# Patient Record
Sex: MALE | Race: WHITE | NOT HISPANIC OR LATINO | Employment: FULL TIME | URBAN - METROPOLITAN AREA
[De-identification: names, ages, dates, MRNs, and addresses within clinical notes are randomized per-mention and may not be internally consistent; named-entity substitution may affect disease eponyms.]

---

## 2017-04-22 ENCOUNTER — HOSPITAL ENCOUNTER (EMERGENCY)
Facility: CLINIC | Age: 52
Discharge: HOME | End: 2017-04-22
Attending: EMERGENCY MEDICINE | Admitting: EMERGENCY MEDICINE
Payer: COMMERCIAL

## 2017-04-22 VITALS
BODY MASS INDEX: 27.74 KG/M2 | OXYGEN SATURATION: 98 % | HEIGHT: 68 IN | DIASTOLIC BLOOD PRESSURE: 101 MMHG | WEIGHT: 183 LBS | RESPIRATION RATE: 16 BRPM | HEART RATE: 120 BPM | TEMPERATURE: 98.5 F | SYSTOLIC BLOOD PRESSURE: 150 MMHG

## 2017-04-22 DIAGNOSIS — S01.112A: ICD-10-CM

## 2017-04-22 PROCEDURE — 12052 INTMD RPR FACE/MM 2.6-5.0 CM: CPT

## 2017-04-22 PROCEDURE — 99284 EMERGENCY DEPT VISIT MOD MDM: CPT | Mod: 25

## 2017-04-22 ASSESSMENT — ENCOUNTER SYMPTOMS
NAUSEA: 0
VOMITING: 0
HEADACHES: 0
WOUND: 1

## 2017-04-22 NOTE — ED PROVIDER NOTES
"  History     Chief Complaint:  Head Laceration     HPI   Gonzalez Bianchi is a 51 year old male who presents to the emergency department today for evaluation of a head laceration. The patient reports that he was playing rugby this morning and collided his head with another player's head. He has a laceration to his left eyelid region and came in for evaluation for this reason. He affirms that he did not lose consciousness, is not experiencing any vision changes, nausea, vomiting, headaches, or other symptoms at this time. Tetanus is up to date per [facility] review.     Allergies:  No Known Drug Allergies    Medications:    The patient is currently on no regular medications.      Past Medical History:    History reviewed. No pertinent past medical history.    Past Surgical History:    Hernia Repair    Family History:    History reviewed. No pertinent family history.     Social History:  The patient was accompanied to the ED by his teammate.   Smoking Status: Current Every Day Smoker  Alcohol Use: Positive  Marital Status:  Single [1]    Review of Systems   Gastrointestinal: Negative for nausea and vomiting.   Skin: Positive for wound (Laceration inferior to left eyebrow).   Neurological: Negative for syncope and headaches.   All other systems reviewed and are negative.  10 point review of systems performed and is negative except as above and in HPI.    Physical Exam   Vitals:  Patient Vitals for the past 24 hrs:   BP Temp Temp src Pulse Resp SpO2 Height Weight   04/22/17 1145 (!) 150/101 98.5  F (36.9  C) Oral 120 16 98 % 1.727 m (5' 8\") 83 kg (183 lb)     Physical Exam   General: Resting on the gurney, appears uncomfortable  Head:  Laceration to superior to left eye; see images below. No other evidence of trauma.   Mouth/Throat: Mucus membranes are moist  CV:  Regular rate    Normal S1 and S2  No pathological murmur   Resp:  Breath sounds clear and equal bilaterally    Non-labored, no retractions or accessory muscle " use    No coarseness    No wheezing   GI:  Abdomen is soft, no rigidity    No tenderness to palpation  MS:  Normal motor assessment of all extremities.    Good capillary refill noted.  Skin:  No rash or lesions noted.  Neuro: Speech is normal and fluent. No apparent deficit. Cranial nerves 2-12 intact. Sensation and strength to extremities x4. Normal lid opening and closing. No evidence of trauma to the eye itself.   Psych:  Awake. Alert.  Normal affect.      Appropriate interactions.                Emergency Department Course     Procedures:     Laceration Repair      LACERATION:  A subcutaneous clean 5 cm laceration.    LOCATION:  Left upper eyelid.    FUNCTION:  Distally sensation, circulation, motor and tendon function are intact. Patient has normal lid function and facial expressions.    ANESTHESIA:  LET - Topical.    PREPARATION:  Irrigation with Normal Saline and Shur Clens.    DEBRIDEMENT: No debridement.    CLOSURE:  Wound was closed with Two Layers: Subcutaneous layer closed with 2 x 5.0 Vicryl Sutures. Skin closed with 6 x 6.0 Ethylon using interrupted sutures.      Emergency Department Course:  Nursing notes and vitals reviewed.  I performed an exam of the patient as documented above.   1222: I spoke with Dr. Bingham of Opthalmology service from the [facility] regarding patient's presentation, findings, and plan of care.  I discussed the treatment plan with the patient. He expressed understanding of this plan and consented to discharge. He will be discharged home with instructions for care and follow up. In addition, the patient will return to the emergency department if their symptoms persist, worsen, if new symptoms arise or if there is any concern.  All questions were answered.    Impression & Plan      Medical Decision Making:  Gonzalez Bianchi is a 51 year old male who presents for evaluation of a laceration to his upper left eyelid. I doubt a midface fracture and will not CT scan at  this point. No signs of entrapment or displaced fracture on detailed midface clinical exam.  Cervical spine is cleared clinically.  The head to toe trauma is exam is negative otherwise and further trauma workup is not necessary.   The wound was carefully evaluated and explored.  The laceration was closed with sutures as noted above. There is no evidence of muscular, tendon, or bony damage with this laceration.  No signs of foreign body.  Possible complications (infection, scarring) were reviewed with the patient.  As the patient's laceration was deep near the orbital margin, I spoke with Dr. Bingham from opthalmology who was able to view the images and recommended two layer closure. The patient had no changes in vision and extraocular movements and facial expressions were intact.     By the [place] head CT rules the patient does not warrant head CT evaluation. Based on workup I believe patient is very low risk for skull fracture and/or intracerebral bleeding now or in future. Discussed delayed bleed.  Concussion is likewise of very low probability with no loss of consciousness and normal mental status here    Follow up with primary care will be indicated for suture removal as noted in the discharge section.    Diagnosis:    ICD-10-CM    1. Laceration of eye region, left, initial encounter S01.102A        Scribe Disclosure:  I, Charles Smith, am serving as a scribe at 12:09 PM on 4/22/2017 to document services personally performed by Chelsi Mckay MD, based on my observations and the provider's statements to me.    4/22/2017    EMERGENCY DEPARTMENT       Chelsi Mckay MD  04/24/17 8191

## 2017-04-22 NOTE — DISCHARGE INSTRUCTIONS

## 2017-04-22 NOTE — ED AVS SNAPSHOT
Emergency Department    [address] [facility] [address]    Phone:  [phone]    Fax:  [phone]                                       Gonzalez Bianchi   MRN: [id number]    Department:   Emergency Department   Date of Visit:  4/22/2017           Patient Information     Date Of Birth          1965        Your diagnoses for this visit were:     Laceration of eye region, left, initial encounter        You were seen by Chelsi Mckay MD.      Follow-up Information     Follow up with Clinic, [facility]. Schedule an appointment as soon as possible for a visit in 3 days.    Why:  For wound re-check    Contact information:    [address] [facility] [address] [phone]          Follow up with Clinic, [facility] In 6 days.    Why:  For suture removal    Contact information:    [address] [facility] [address] [phone]          Follow up with  Emergency Department.    Specialty:  EMERGENCY MEDICINE    Why:  As needed, If symptoms worsen    Contact information:    [address] [facility] [id number] [phone]        Discharge Instructions       Discharge Instructions  Laceration (Cut)    You were seen today for a laceration (cut).  Your doctor examined your laceration for any problems such a buried foreign body (like glass, a splinter, or gravel), or injury to blood vessels, tendons, and nerves.  Your doctor may have also rinsed and/or scrubbed your laceration to help prevent an infection.  Your laceration may have been closed with glue, staples or sutures (stitches).      It may not be possible to find all problems with your laceration on the first visit, and we can't always prevent infections.  Antibiotics are only given when the benefit is more than the risk, and don't prevent all infections. Some lacerations are too high risk to close, and are left open to heal.  All lacerations, no matter how  expertly repaired, will cause scarring.    Return to the Emergency Department right away if:    You have more redness, swelling, pain, drainage (pus), a bad smell, or red streaking from your laceration.      You have a fever of 101oF or more.    You have bleeding that you can t stop at home. If your cut starts to bleed, hold pressure on the bleeding area with a clean cloth or put pressure over the bandage.  If the bleeding doesn t stop after using constant pressure for 30 minutes, you should return to the Emergency Department for further treatment.    An area past the laceration is cool, pale, or blue compared with the other side, or has a slower return of color when squeezed.    Your dressing seems too tight or starts to get uncomfortable or painful.    You have loss of normal function or use of an area, such as being unable to straighten or bend a finger normally.    You have a numb area past the laceration.    Return to the Emergency Department or see your regular doctor if:    The laceration starts to come open.     You have something coming out of the cut or a feeling that there is something in the laceration.    Your wound will not heal, or keeps breaking open. There can always be glass, wood, dirt or other things in any wound.  They won t always show up, even on x-rays.  If a wound doesn t heal, this may be why, and it is important to follow-up with your regular doctor.    Home Care:    Take your dressing off in 12 hours, or as instructed by your doctor, to check your laceration. Remove the dressing sooner if it seems too tight or painful, or if it is getting numb, tingly, or pale past the dressing.    Gently wash your laceration 2 times a day with clean cloth and soap.     It is okay to shower, but do not let the laceration soak in water.      If your laceration was closed with wound adhesive or strips: pat it dry and leave it open to the air.     For all other repairs: after you wash your laceration, or at  "least 2 times a day, apply bacitracin or other antibiotic ointment to the laceration, then cover it with a Band-Aid  or gauze.    Keep the laceration clean. Wear gloves or other protective clothing if you are around dirt.    Follow-up:    You need to follow-up with your regular doctor in 3 days.    Your sutures or staples need to be removed in 6 days. Schedule an appointment with your regular doctor to have this done.    Scars:  To help minimize scarring:    Wear sunscreen over the healed laceration when out in the sun.    Massage the area regularly.    You may use Vitamin E oil.    Wait a year.  Most scars will start to fade within a year.    Probiotics: If you have been given an antibiotic, you may want to also take a probiotic pill or eat yogurt with live cultures. Probiotics have \"good bacteria\" to help your intestines stay healthy. Studies have shown that probiotics help prevent diarrhea and other intestine problems (including C. diff infection) when you take antibiotics. You can buy these without a prescription in the pharmacy section of the store.     If you were given a prescription for medicine here today, be sure to read all of the information (including the package insert) that comes with your prescription.  This will include important information about the medicine, its side effects, and any warnings that you need to know about.  The pharmacist who fills the prescription can provide more information and answer questions you may have about the medicine.  If you have questions or concerns that the pharmacist cannot address, please call or return to the Emergency Department.     Opioid Medication Information    Pain medications are among the most commonly prescribed medicines, so we are including this information for all our patients. If you did not receive pain medication or get a prescription for pain medicine, you can ignore it.     You may have been given a prescription for an opioid (narcotic) pain " medicine and/or have received a pain medicine while here in the Emergency Department. These medicines can make you drowsy or impaired. You must not drive, operate dangerous equipment, or engage in any other dangerous activities while taking these medications. If you drive while taking these medications, you could be arrested for DUI, or driving under the influence. Do not drink any alcohol while you are taking these medications.     Opioid pain medications can cause addiction. If you have a history of chemical dependency of any type, you are at a higher risk of becoming addicted to pain medications.  Only take these prescribed medications to treat your pain when all other options have been tried. Take it for as short a time and as few doses as possible. Store your pain pills in a secure place, as they are frequently stolen and provide a dangerous opportunity for children or visitors in your house to start abusing these powerful medications. We will not replace any lost or stolen medicine.  As soon as your pain is better, you should flush all your remaining medication.     Many prescription pain medications contain Tylenol  (acetaminophen), including Vicodin , Tylenol #3 , Norco , Lortab , and Percocet .  You should not take any extra pills of Tylenol  if you are using these prescription medications or you can get very sick.  Do not ever take more than 3000 mg of acetaminophen in any 24 hour period.    All opioids tend to cause constipation. Drink plenty of water and eat foods that have a lot of fiber, such as fruits, vegetables, prune juice, apple juice and high fiber cereal.  Take a laxative if you don t move your bowels at least every other day. Miralax , Milk of Magnesia, Colace , or Senna  can be used to keep you regular.      Remember that you can always come back to the Emergency Department if you are not able to see your regular doctor in the amount of time listed above, if you get any new symptoms, or if  there is anything that worries you.          24 Hour Appointment Hotline       To make an appointment at any [facility], call [phone] ([phone]). If you don't have a family doctor or clinic, we will help you find one. [facility] clinics are conveniently located to serve the needs of you and your family.             Review of your medicines      Notice     You have not been prescribed any medications.            Orders Needing Specimen Collection     None      Pending Results     No orders found from 4/20/2017 to 4/23/2017.            Pending Culture Results     No orders found from 4/20/2017 to 4/23/2017.            Test Results From Your Hospital Stay               Clinical Quality Measure: Blood Pressure Screening     Your blood pressure was checked while you were in the emergency department today. The last reading we obtained was  BP: (!) 150/101 . Please read the guidelines below about what these numbers mean and what you should do about them.  If your systolic blood pressure (the top number) is less than 120 and your diastolic blood pressure (the bottom number) is less than 80, then your blood pressure is normal. There is nothing more that you need to do about it.  If your systolic blood pressure (the top number) is 120-139 or your diastolic blood pressure (the bottom number) is 80-89, your blood pressure may be higher than it should be. You should have your blood pressure rechecked within a year by a primary care provider.  If your systolic blood pressure (the top number) is 140 or greater or your diastolic blood pressure (the bottom number) is 90 or greater, you may have high blood pressure. High blood pressure is treatable, but if left untreated over time it can put you at risk for heart attack, stroke, or kidney failure. You should have your blood pressure rechecked by a primary care provider within the next 4 weeks.  If your provider in the emergency department today gave you specific  "instructions to follow-up with your doctor or provider even sooner than that, you should follow that instruction and not wait for up to 4 weeks for your follow-up visit.        Thank you for choosing [facility]       Thank you for choosing [facility] for your care. Our goal is always to provide you with excellent care. Hearing back from our patients is one way we can continue to improve our services. Please take a few minutes to complete the written survey that you may receive in the mail after you visit with us. Thank you!        [employer] Information     [employer] lets you send messages to your doctor, view your test results, renew your prescriptions, schedule appointments and more. To sign up, go to [email or web address] . Click on \"Log in\" on the left side of the screen, which will take you to the Welcome page. Then click on \"Sign up Now\" on the right side of the page.     You will be asked to enter the access code listed below, as well as some personal information. Please follow the directions to create your username and password.     Your access code is: [email or web address]  Expires: 2017  2:07 PM     Your access code will  in 90 days. If you need help or a new code, please call your [facility] clinic or [phone].        Care EveryWhere ID     This is your Care EveryWhere ID. This could be used by other organizations to access your [facility] medical records  [id number]        After Visit Summary       This is your record. Keep this with you and show to your community pharmacist(s) and doctor(s) at your next visit.                  "

## 2017-04-22 NOTE — ED AVS SNAPSHOT
Emergency Department    [address] [place] [address]    Phone:  [phone]    Fax:  [phone]                                       Gonzalez Bianchi   MRN: [id number]    Department:   Emergency Department   Date of Visit:  4/22/2017           After Visit Summary Signature Page     I have received my discharge instructions, and my questions have been answered. I have discussed any challenges I see with this plan with the nurse or doctor.    ..........................................................................................................................................  Patient/Patient Representative Signature      ..........................................................................................................................................  Patient Representative Print Name and Relationship to Patient    ..................................................               ................................................  Date                                            Time    ..........................................................................................................................................  Reviewed by Signature/Title    ...................................................              ..............................................  Date                                                            Time

## 2022-11-11 ENCOUNTER — HOSPITAL ENCOUNTER (OUTPATIENT)
Facility: CLINIC | Age: 57
Discharge: HOME | End: 2022-11-11
Attending: EMERGENCY MEDICINE | Admitting: COLON & RECTAL SURGERY
Payer: COMMERCIAL

## 2022-11-11 ENCOUNTER — ANESTHESIA EVENT (OUTPATIENT)
Dept: SURGERY | Facility: CLINIC | Age: 57
End: 2022-11-11
Payer: COMMERCIAL

## 2022-11-11 ENCOUNTER — APPOINTMENT (OUTPATIENT)
Dept: CT IMAGING | Facility: CLINIC | Age: 57
End: 2022-11-11
Attending: EMERGENCY MEDICINE
Payer: COMMERCIAL

## 2022-11-11 ENCOUNTER — ANESTHESIA (OUTPATIENT)
Dept: SURGERY | Facility: CLINIC | Age: 57
End: 2022-11-11
Payer: COMMERCIAL

## 2022-11-11 VITALS
HEART RATE: 99 BPM | HEIGHT: 68 IN | WEIGHT: 182.98 LBS | RESPIRATION RATE: 17 BRPM | OXYGEN SATURATION: 96 % | BODY MASS INDEX: 27.73 KG/M2 | DIASTOLIC BLOOD PRESSURE: 87 MMHG | TEMPERATURE: 99 F | SYSTOLIC BLOOD PRESSURE: 129 MMHG

## 2022-11-11 DIAGNOSIS — K61.1 PERIRECTAL ABSCESS: ICD-10-CM

## 2022-11-11 DIAGNOSIS — E87.6 HYPOKALEMIA: ICD-10-CM

## 2022-11-11 LAB
ALBUMIN SERPL-MCNC: 2.7 G/DL (ref 3.4–5)
ALP SERPL-CCNC: 68 U/L (ref 40–150)
ALT SERPL W P-5'-P-CCNC: 46 U/L (ref 0–70)
ANION GAP SERPL CALCULATED.3IONS-SCNC: 8 MMOL/L (ref 3–14)
AST SERPL W P-5'-P-CCNC: 25 U/L (ref 0–45)
BASOPHILS # BLD AUTO: 0 10E3/UL (ref 0–0.2)
BASOPHILS NFR BLD AUTO: 0 %
BILIRUB SERPL-MCNC: 1.5 MG/DL (ref 0.2–1.3)
BUN SERPL-MCNC: 11 MG/DL (ref 7–30)
CALCIUM SERPL-MCNC: 8.1 MG/DL (ref 8.5–10.1)
CHLORIDE BLD-SCNC: 107 MMOL/L (ref 94–109)
CO2 SERPL-SCNC: 24 MMOL/L (ref 20–32)
CREAT SERPL-MCNC: 0.89 MG/DL (ref 0.66–1.25)
EOSINOPHIL # BLD AUTO: 0 10E3/UL (ref 0–0.7)
EOSINOPHIL NFR BLD AUTO: 0 %
ERYTHROCYTE [DISTWIDTH] IN BLOOD BY AUTOMATED COUNT: 11.9 % (ref 10–15)
GFR SERPL CREATININE-BSD FRML MDRD: >90 ML/MIN/1.73M2
GLUCOSE BLD-MCNC: 93 MG/DL (ref 70–99)
HCT VFR BLD AUTO: 43.7 % (ref 40–53)
HGB BLD-MCNC: 14.7 G/DL (ref 13.3–17.7)
IMM GRANULOCYTES # BLD: 0.1 10E3/UL
IMM GRANULOCYTES NFR BLD: 0 %
LYMPHOCYTES # BLD AUTO: 1 10E3/UL (ref 0.8–5.3)
LYMPHOCYTES NFR BLD AUTO: 7 %
MCH RBC QN AUTO: 31.7 PG (ref 26.5–33)
MCHC RBC AUTO-ENTMCNC: 33.6 G/DL (ref 31.5–36.5)
MCV RBC AUTO: 94 FL (ref 78–100)
MONOCYTES # BLD AUTO: 1.1 10E3/UL (ref 0–1.3)
MONOCYTES NFR BLD AUTO: 8 %
NEUTROPHILS # BLD AUTO: 12.1 10E3/UL (ref 1.6–8.3)
NEUTROPHILS NFR BLD AUTO: 85 %
NRBC # BLD AUTO: 0 10E3/UL
NRBC BLD AUTO-RTO: 0 /100
PLATELET # BLD AUTO: 299 10E3/UL (ref 150–450)
POTASSIUM BLD-SCNC: 3.3 MMOL/L (ref 3.4–5.3)
PROT SERPL-MCNC: 6.6 G/DL (ref 6.8–8.8)
RBC # BLD AUTO: 4.64 10E6/UL (ref 4.4–5.9)
SARS-COV-2 RNA RESP QL NAA+PROBE: NEGATIVE
SODIUM SERPL-SCNC: 139 MMOL/L (ref 133–144)
WBC # BLD AUTO: 14.3 10E3/UL (ref 4–11)

## 2022-11-11 PROCEDURE — C9803 HOPD COVID-19 SPEC COLLECT: HCPCS

## 2022-11-11 PROCEDURE — 99285 EMERGENCY DEPT VISIT HI MDM: CPT | Mod: 25

## 2022-11-11 PROCEDURE — 250N000009 HC RX 250: Performed by: ANESTHESIOLOGY

## 2022-11-11 PROCEDURE — 250N000011 HC RX IP 250 OP 636: Performed by: ANESTHESIOLOGY

## 2022-11-11 PROCEDURE — 80053 COMPREHEN METABOLIC PANEL: CPT | Performed by: EMERGENCY MEDICINE

## 2022-11-11 PROCEDURE — 250N000009 HC RX 250: Performed by: EMERGENCY MEDICINE

## 2022-11-11 PROCEDURE — 250N000011 HC RX IP 250 OP 636: Performed by: EMERGENCY MEDICINE

## 2022-11-11 PROCEDURE — 710N000009 HC RECOVERY PHASE 1, LEVEL 1, PER MIN: Performed by: COLON & RECTAL SURGERY

## 2022-11-11 PROCEDURE — 96366 THER/PROPH/DIAG IV INF ADDON: CPT

## 2022-11-11 PROCEDURE — 96376 TX/PRO/DX INJ SAME DRUG ADON: CPT

## 2022-11-11 PROCEDURE — 72193 CT PELVIS W/DYE: CPT

## 2022-11-11 PROCEDURE — 710N000012 HC RECOVERY PHASE 2, PER MINUTE: Performed by: COLON & RECTAL SURGERY

## 2022-11-11 PROCEDURE — 360N000075 HC SURGERY LEVEL 2, PER MIN: Performed by: COLON & RECTAL SURGERY

## 2022-11-11 PROCEDURE — 96375 TX/PRO/DX INJ NEW DRUG ADDON: CPT

## 2022-11-11 PROCEDURE — U0005 INFEC AGEN DETEC AMPLI PROBE: HCPCS | Performed by: EMERGENCY MEDICINE

## 2022-11-11 PROCEDURE — 96365 THER/PROPH/DIAG IV INF INIT: CPT | Mod: 59

## 2022-11-11 PROCEDURE — 370N000017 HC ANESTHESIA TECHNICAL FEE, PER MIN: Performed by: COLON & RECTAL SURGERY

## 2022-11-11 PROCEDURE — 250N000025 HC SEVOFLURANE, PER MIN: Performed by: COLON & RECTAL SURGERY

## 2022-11-11 PROCEDURE — 258N000003 HC RX IP 258 OP 636: Performed by: ANESTHESIOLOGY

## 2022-11-11 PROCEDURE — 85025 COMPLETE CBC W/AUTO DIFF WBC: CPT | Performed by: EMERGENCY MEDICINE

## 2022-11-11 PROCEDURE — 250N000009 HC RX 250: Performed by: COLON & RECTAL SURGERY

## 2022-11-11 PROCEDURE — 999N000141 HC STATISTIC PRE-PROCEDURE NURSING ASSESSMENT: Performed by: COLON & RECTAL SURGERY

## 2022-11-11 PROCEDURE — 272N000001 HC OR GENERAL SUPPLY STERILE: Performed by: COLON & RECTAL SURGERY

## 2022-11-11 PROCEDURE — 36415 COLL VENOUS BLD VENIPUNCTURE: CPT | Performed by: EMERGENCY MEDICINE

## 2022-11-11 PROCEDURE — 82040 ASSAY OF SERUM ALBUMIN: CPT | Performed by: EMERGENCY MEDICINE

## 2022-11-11 PROCEDURE — 250N000011 HC RX IP 250 OP 636: Performed by: COLON & RECTAL SURGERY

## 2022-11-11 RX ORDER — HYDROMORPHONE HCL IN WATER/PF 6 MG/30 ML
0.2 PATIENT CONTROLLED ANALGESIA SYRINGE INTRAVENOUS EVERY 5 MIN PRN
Status: DISCONTINUED | OUTPATIENT
Start: 2022-11-11 | End: 2022-11-11 | Stop reason: HOSPADM

## 2022-11-11 RX ORDER — MEPERIDINE HYDROCHLORIDE 25 MG/ML
12.5 INJECTION INTRAMUSCULAR; INTRAVENOUS; SUBCUTANEOUS
Status: DISCONTINUED | OUTPATIENT
Start: 2022-11-11 | End: 2022-11-11 | Stop reason: HOSPADM

## 2022-11-11 RX ORDER — FENTANYL CITRATE 0.05 MG/ML
25 INJECTION, SOLUTION INTRAMUSCULAR; INTRAVENOUS
Status: DISCONTINUED | OUTPATIENT
Start: 2022-11-11 | End: 2022-11-11 | Stop reason: HOSPADM

## 2022-11-11 RX ORDER — OXYCODONE HYDROCHLORIDE 5 MG/1
5 TABLET ORAL EVERY 4 HOURS PRN
Status: DISCONTINUED | OUTPATIENT
Start: 2022-11-11 | End: 2022-11-11 | Stop reason: HOSPADM

## 2022-11-11 RX ORDER — IOPAMIDOL 755 MG/ML
92 INJECTION, SOLUTION INTRAVASCULAR ONCE
Status: COMPLETED | OUTPATIENT
Start: 2022-11-11 | End: 2022-11-11

## 2022-11-11 RX ORDER — POTASSIUM CHLORIDE 7.45 MG/ML
10 INJECTION INTRAVENOUS ONCE
Status: COMPLETED | OUTPATIENT
Start: 2022-11-11 | End: 2022-11-11

## 2022-11-11 RX ORDER — PROPOFOL 10 MG/ML
INJECTION, EMULSION INTRAVENOUS PRN
Status: DISCONTINUED | OUTPATIENT
Start: 2022-11-11 | End: 2022-11-11

## 2022-11-11 RX ORDER — HYDROMORPHONE HYDROCHLORIDE 1 MG/ML
1 INJECTION, SOLUTION INTRAMUSCULAR; INTRAVENOUS; SUBCUTANEOUS
Status: DISCONTINUED | OUTPATIENT
Start: 2022-11-11 | End: 2022-11-11 | Stop reason: HOSPADM

## 2022-11-11 RX ORDER — SODIUM CHLORIDE, SODIUM LACTATE, POTASSIUM CHLORIDE, CALCIUM CHLORIDE 600; 310; 30; 20 MG/100ML; MG/100ML; MG/100ML; MG/100ML
INJECTION, SOLUTION INTRAVENOUS CONTINUOUS PRN
Status: DISCONTINUED | OUTPATIENT
Start: 2022-11-11 | End: 2022-11-11

## 2022-11-11 RX ORDER — HYDROMORPHONE HYDROCHLORIDE 1 MG/ML
0.5 INJECTION, SOLUTION INTRAMUSCULAR; INTRAVENOUS; SUBCUTANEOUS ONCE
Status: COMPLETED | OUTPATIENT
Start: 2022-11-11 | End: 2022-11-11

## 2022-11-11 RX ORDER — FENTANYL CITRATE 50 UG/ML
INJECTION, SOLUTION INTRAMUSCULAR; INTRAVENOUS PRN
Status: DISCONTINUED | OUTPATIENT
Start: 2022-11-11 | End: 2022-11-11

## 2022-11-11 RX ORDER — ONDANSETRON 2 MG/ML
4 INJECTION INTRAMUSCULAR; INTRAVENOUS EVERY 30 MIN PRN
Status: DISCONTINUED | OUTPATIENT
Start: 2022-11-11 | End: 2022-11-11 | Stop reason: HOSPADM

## 2022-11-11 RX ORDER — OXYCODONE HYDROCHLORIDE 5 MG/1
10 TABLET ORAL
Status: DISCONTINUED | OUTPATIENT
Start: 2022-11-11 | End: 2022-11-11 | Stop reason: HOSPADM

## 2022-11-11 RX ORDER — HYDRALAZINE HYDROCHLORIDE 20 MG/ML
2.5-5 INJECTION INTRAMUSCULAR; INTRAVENOUS
Status: DISCONTINUED | OUTPATIENT
Start: 2022-11-11 | End: 2022-11-11 | Stop reason: HOSPADM

## 2022-11-11 RX ORDER — LIDOCAINE HYDROCHLORIDE 20 MG/ML
INJECTION, SOLUTION INFILTRATION; PERINEURAL PRN
Status: DISCONTINUED | OUTPATIENT
Start: 2022-11-11 | End: 2022-11-11

## 2022-11-11 RX ORDER — ONDANSETRON 2 MG/ML
INJECTION INTRAMUSCULAR; INTRAVENOUS PRN
Status: DISCONTINUED | OUTPATIENT
Start: 2022-11-11 | End: 2022-11-11

## 2022-11-11 RX ORDER — DEXAMETHASONE SODIUM PHOSPHATE 4 MG/ML
INJECTION, SOLUTION INTRA-ARTICULAR; INTRALESIONAL; INTRAMUSCULAR; INTRAVENOUS; SOFT TISSUE PRN
Status: DISCONTINUED | OUTPATIENT
Start: 2022-11-11 | End: 2022-11-11

## 2022-11-11 RX ORDER — ACETAMINOPHEN 325 MG/1
975 TABLET ORAL ONCE
Status: DISCONTINUED | OUTPATIENT
Start: 2022-11-11 | End: 2022-11-11 | Stop reason: HOSPADM

## 2022-11-11 RX ORDER — FENTANYL CITRATE 0.05 MG/ML
25 INJECTION, SOLUTION INTRAMUSCULAR; INTRAVENOUS EVERY 5 MIN PRN
Status: DISCONTINUED | OUTPATIENT
Start: 2022-11-11 | End: 2022-11-11 | Stop reason: HOSPADM

## 2022-11-11 RX ORDER — OXYCODONE HYDROCHLORIDE 5 MG/1
5-10 TABLET ORAL EVERY 4 HOURS PRN
Qty: 10 TABLET | Refills: 0 | Status: CANCELLED | OUTPATIENT
Start: 2022-11-11

## 2022-11-11 RX ORDER — ONDANSETRON 4 MG/1
4 TABLET, ORALLY DISINTEGRATING ORAL EVERY 30 MIN PRN
Status: DISCONTINUED | OUTPATIENT
Start: 2022-11-11 | End: 2022-11-11 | Stop reason: HOSPADM

## 2022-11-11 RX ORDER — BUPIVACAINE HYDROCHLORIDE 5 MG/ML
INJECTION, SOLUTION PERINEURAL PRN
Status: DISCONTINUED | OUTPATIENT
Start: 2022-11-11 | End: 2022-11-11 | Stop reason: HOSPADM

## 2022-11-11 RX ORDER — ONDANSETRON 2 MG/ML
4 INJECTION INTRAMUSCULAR; INTRAVENOUS ONCE
Status: DISCONTINUED | OUTPATIENT
Start: 2022-11-11 | End: 2022-11-11 | Stop reason: HOSPADM

## 2022-11-11 RX ORDER — IOPAMIDOL 755 MG/ML
100 INJECTION, SOLUTION INTRAVASCULAR ONCE
Status: DISCONTINUED | OUTPATIENT
Start: 2022-11-11 | End: 2022-11-11

## 2022-11-11 RX ORDER — OXYCODONE HYDROCHLORIDE 5 MG/1
5 TABLET ORAL EVERY 6 HOURS PRN
Qty: 12 TABLET | Refills: 0 | Status: SHIPPED | OUTPATIENT
Start: 2022-11-11 | End: 2022-11-11

## 2022-11-11 RX ORDER — OXYCODONE HYDROCHLORIDE 5 MG/1
5 TABLET ORAL EVERY 6 HOURS PRN
Qty: 12 TABLET | Refills: 0 | Status: SHIPPED | OUTPATIENT
Start: 2022-11-11

## 2022-11-11 RX ORDER — SODIUM CHLORIDE, SODIUM LACTATE, POTASSIUM CHLORIDE, CALCIUM CHLORIDE 600; 310; 30; 20 MG/100ML; MG/100ML; MG/100ML; MG/100ML
INJECTION, SOLUTION INTRAVENOUS CONTINUOUS
Status: DISCONTINUED | OUTPATIENT
Start: 2022-11-11 | End: 2022-11-11 | Stop reason: HOSPADM

## 2022-11-11 RX ORDER — KETOROLAC TROMETHAMINE 30 MG/ML
30 INJECTION, SOLUTION INTRAMUSCULAR; INTRAVENOUS ONCE
Status: COMPLETED | OUTPATIENT
Start: 2022-11-11 | End: 2022-11-11

## 2022-11-11 RX ORDER — MAGNESIUM HYDROXIDE 1200 MG/15ML
LIQUID ORAL PRN
Status: DISCONTINUED | OUTPATIENT
Start: 2022-11-11 | End: 2022-11-11 | Stop reason: HOSPADM

## 2022-11-11 RX ORDER — CLINDAMYCIN PHOSPHATE 900 MG/50ML
900 INJECTION, SOLUTION INTRAVENOUS ONCE
Status: COMPLETED | OUTPATIENT
Start: 2022-11-11 | End: 2022-11-11

## 2022-11-11 RX ADMIN — KETOROLAC TROMETHAMINE 30 MG: 30 INJECTION, SOLUTION INTRAMUSCULAR; INTRAVENOUS at 19:09

## 2022-11-11 RX ADMIN — PROPOFOL 200 MG: 10 INJECTION, EMULSION INTRAVENOUS at 18:29

## 2022-11-11 RX ADMIN — IOPAMIDOL 92 ML: 755 INJECTION, SOLUTION INTRAVENOUS at 09:33

## 2022-11-11 RX ADMIN — DEXAMETHASONE SODIUM PHOSPHATE 4 MG: 4 INJECTION, SOLUTION INTRA-ARTICULAR; INTRALESIONAL; INTRAMUSCULAR; INTRAVENOUS; SOFT TISSUE at 18:15

## 2022-11-11 RX ADMIN — POTASSIUM CHLORIDE 10 MEQ: 7.46 INJECTION, SOLUTION INTRAVENOUS at 13:52

## 2022-11-11 RX ADMIN — ONDANSETRON 4 MG: 2 INJECTION INTRAMUSCULAR; INTRAVENOUS at 18:15

## 2022-11-11 RX ADMIN — HYDROMORPHONE HYDROCHLORIDE 0.5 MG: 1 INJECTION, SOLUTION INTRAMUSCULAR; INTRAVENOUS; SUBCUTANEOUS at 08:25

## 2022-11-11 RX ADMIN — PROPOFOL 200 MG: 10 INJECTION, EMULSION INTRAVENOUS at 18:10

## 2022-11-11 RX ADMIN — FENTANYL CITRATE 50 MCG: 50 INJECTION, SOLUTION INTRAMUSCULAR; INTRAVENOUS at 18:27

## 2022-11-11 RX ADMIN — MIDAZOLAM 2 MG: 1 INJECTION INTRAMUSCULAR; INTRAVENOUS at 18:02

## 2022-11-11 RX ADMIN — LIDOCAINE HYDROCHLORIDE 60 MG: 20 INJECTION, SOLUTION INFILTRATION; PERINEURAL at 18:10

## 2022-11-11 RX ADMIN — SODIUM CHLORIDE, POTASSIUM CHLORIDE, SODIUM LACTATE AND CALCIUM CHLORIDE: 600; 310; 30; 20 INJECTION, SOLUTION INTRAVENOUS at 18:02

## 2022-11-11 RX ADMIN — HYDROMORPHONE HYDROCHLORIDE 0.5 MG: 1 INJECTION, SOLUTION INTRAMUSCULAR; INTRAVENOUS; SUBCUTANEOUS at 13:49

## 2022-11-11 RX ADMIN — SUCCINYLCHOLINE CHLORIDE 160 MG: 20 INJECTION, SOLUTION INTRAMUSCULAR; INTRAVENOUS; PARENTERAL at 18:10

## 2022-11-11 RX ADMIN — FENTANYL CITRATE 50 MCG: 50 INJECTION, SOLUTION INTRAMUSCULAR; INTRAVENOUS at 18:10

## 2022-11-11 RX ADMIN — CLINDAMYCIN PHOSPHATE 900 MG: 900 INJECTION, SOLUTION INTRAVENOUS at 11:37

## 2022-11-11 RX ADMIN — SODIUM CHLORIDE 68 ML: 900 INJECTION INTRAVENOUS at 09:33

## 2022-11-11 ASSESSMENT — ENCOUNTER SYMPTOMS
VOMITING: 0
FEVER: 0
COUGH: 0
ABDOMINAL PAIN: 1
CHILLS: 0
SEIZURES: 0
DIAPHORESIS: 1
CONSTIPATION: 1
RHINORRHEA: 0
LIGHT-HEADEDNESS: 0
BACK PAIN: 1
DYSRHYTHMIAS: 0
SORE THROAT: 0

## 2022-11-11 ASSESSMENT — ACTIVITIES OF DAILY LIVING (ADL)
ADLS_ACUITY_SCORE: 35

## 2022-11-11 ASSESSMENT — LIFESTYLE VARIABLES: TOBACCO_USE: 1

## 2022-11-11 ASSESSMENT — COPD QUESTIONNAIRES: COPD: 0

## 2022-11-11 NOTE — ED TRIAGE NOTES
Patient reports internal rectal pain since Monday and constipation.      Triage Assessment     Row Name 11/11/22 2083       Triage Assessment (Adult)    Airway WDL WDL       Respiratory WDL    Respiratory WDL WDL       Cognitive/Neuro/Behavioral WDL    Cognitive/Neuro/Behavioral WDL WDL
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
## 2022-11-11 NOTE — CONSULTS
[facility]  Colon and Rectal Surgery Consult Note  Name: Gonzalez Bianchi    MRN: [id number]  YOB: 1965    Age: 57 year old  Date of admission: 11/11/2022  Primary care provider: Clinic, [facility]     Requesting Physician:  Dr. Torrez  Reason for consult:  Perirectal abscess           History of Present Illness:   Gonzalez Bianchi is a 57 year old with a history of an umbilical hernia and hemorrhoids who presents with rectal pain.  On Monday he started to have rectal pain and thought he may be having a hemorrhoid flare.  The pain became progressively worse over the course of the next 3 days, and now today it is quite severe.  The pain and pressure that has built up is making it difficult to have a bowel movement.  He is unable to sit for more than a few minutes without extreme discomfort. He has developed other symptoms as well including night sweats and subjective fevers, fatigue, and low right back pain.  He presented to the ED this morning for evaluation.  Labs are notable for WBC 14.3, K+ 3.3, albumin 2.7, T bili 1.5, COVID-negative, otherwise unremarkable.  A CT pelvis was done which showed a 2 x 3 cm right sided perirectal abscess.  External exam in the ED revealed no obvious location to attempt a bedside I&D of this abscess, so CRS was consulted for further evaluation and management.        Colonoscopy History:  Had one 30 years ago ?    Past abdominal surgery: Hernia repair            Past Medical History:   No past medical history on file.          Past Surgical History:     Past Surgical History:   Procedure Laterality Date     HERNIA REPAIR                 Social History:     Social History     Tobacco Use     Smoking status: Every Day     Packs/day: 0.50     Types: Cigarettes     Smokeless tobacco: Not on file   Substance Use Topics     Alcohol use: Yes             Family History:   No family history on file.          Allergies:   No Known Allergies           Medications:       potassium chloride  10 mEq Intravenous Once             Review of Systems:   A comprehensive greater than 10 system review of systems was carried out.  Pertinent positives and negatives are noted above.  Otherwise negative for contributory info.            Physical Exam:     Blood pressure (!) 132/95, pulse (!) 1, temperature 97.6  F (36.4  C), temperature source Temporal, resp. rate 16, SpO2 98 %.  No intake or output data in the 24 hours ending 11/11/22 1235  Exam:  General - Awake alert and oriented, appears stated age, laying on side  Pulm - Non-labored breathing with normal respiratory effort  CVS - reg rate and rhythm, no peripheral edema  Rectal - Perianal skin intact but thickened. No swollen or thrombosed external hemorrhoids.  No notable erythema, induration, or fluctuance.  No tenderness with light palpation of the right buttock, but with deep palpation he does have tenderness.  The right buttock feels more firm than the left.  With JHON, there is fullness and tenderness along the right side of the rectum. No blood or pus on gloved finger.  Neuro - CN II-XII grossly intact   Musculoskeletal - extremities with no clubbing, cyanosis or edema  Psych - responsive, alert, cooperative; oriented x3; appropriate mood and affect  External/skin - inspection reveals no rashes, lesions or ulcers, normal coloring             Data Reviewed:     Recent Results (from the past 24 hour(s))   CT Pelvis Soft Tissue w Contrast    Narrative    CT PELVIS WITH CONTRAST  11/11/2022 9:48 AM     HISTORY: Right perirectal pain and swelling, concern for  rectal/perirectal abscess.    COMPARISON: None.    TECHNIQUE: Volumetric helical acquisition of axial CT image data were  acquired through the pelvis after administration of 92 mL Isovue-370  intravenous contrast. Coronal images reconstructed from axial image  data. Radiation dose for this scan was reduced using automated  exposure control, adjustment of the mA  and/or kV according to patient  size, or iterative reconstruction technique.    FINDINGS: 2.0 x 3.0 cm perirectal/perianal abscess on the right.  Adjacent fascial thickening. No subcutaneous emphysema. No gross  fistula demonstrated, although sensitivity for this is limited. No  intrapelvic abscess. No dilated bowel.      Impression    IMPRESSION: 2.0 x 3.0 cm right perirectal/perianal abscess.    ENRIQUETA SORIA MD         SYSTEM ID:  [id number]       Recent Labs   Lab 11/11/22  0822   WBC 14.3*   HGB 14.7   HCT 43.7   MCV 94        Recent Labs   Lab 11/11/22 0822      POTASSIUM 3.3*   CHLORIDE 107   CO2 24   ANIONGAP 8   GLC 93   BUN 11   CR 0.89   GFRESTIMATED >90   RIGO 8.1*   PROTTOTAL 6.6*   ALBUMIN 2.7*   BILITOTAL 1.5*   ALKPHOS 68   AST 25   ALT 46         Assessment and Plan:   Gonzalez Bianchi is a 57 year old male who presented with 4 days of progressively worsening rectal pain, constipation, and now fevers and fatigue.  Work-up in the ED revealed leukocytosis to 14.3 and a right-sided 2 x 3 cm perirectal abscess on CT pelvis.  On exam in the ED there were no obvious areas of fluctuance or induration where it would be easy to attempt an incision and drainage, so CRS was consulted.  Rectal exam performed, and we agree that there are no findings externally that would suggest that a bedside I&D would be successful or tolerable.  Recommended exam under anesthesia with incision and drainage of the perirectal abscess in the OR, and the patient would like to have this done.  Dr. Madera has requested to add this case on today but we do not have a time yet, and it will likely not go until after 6 PM or later.  The patient would prefer to stay and get it done today at some point rather than to go home and come back tomorrow morning to do it on a semielective basis.  Keep NPO.  Will likely send home after the procedure with a course of oral antibiotics.    Discussed with Dr. Madera.    Patient specific  identified risk factors considered as part of today s evaluation include: None    Additional history obtained from patient, significant other, chart review.  Time spent on consultation: 35 minutes, greater than 50% spent on counseling and/or coordination of care.      Josse Treadwell PA-C  Colorectal Physician Assistant    [facility]  [address]  [place] [address]  T: [phone]  F: [phone]

## 2022-11-11 NOTE — PHARMACY-ADMISSION MEDICATION HISTORY
Pharmacy Medication History  Admission medication history interview status for the 11/11/2022  admission is complete. See EPIC admission navigator for prior to admission medications     Location of Interview: Patient room  Medication history sources: Patient, Surescripts and Care Everywhere    Significant changes made to the medication list:  None    In the past week, patient estimated taking medication this percent of the time: greater than 90%    Additional medication history information:   Patient states he takes a multivitamin, vitamin D3, and vitamin E every once in a while. I did not add these medications to his list.     Medication reconciliation completed by provider prior to medication history? No    Time spent in this activity: 5 mins    Prior to Admission medications    Not on File       The information provided in this note is only as accurate as the sources available at the time of update(s)

## 2022-11-12 NOTE — ANESTHESIA POSTPROCEDURE EVALUATION
Patient: Gonzalez Bianchi    Procedure: Procedure(s):  INCISION AND DRAINAGE PERIRECTAL ABSCESS       Anesthesia Type:  General    Note:     Postop Pain Control: Uneventful            Sign Out: Well controlled pain   PONV: No   Neuro/Psych: Uneventful            Sign Out: Acceptable/Baseline neuro status   Airway/Respiratory: Uneventful            Sign Out: Acceptable/Baseline resp. status   CV/Hemodynamics: Uneventful            Sign Out: Acceptable CV status; No obvious hypovolemia; No obvious fluid overload   Other NRE: NONE   DID A NON-ROUTINE EVENT OCCUR? No           Last vitals:  Vitals Value Taken Time   /86 11/11/22 1940   Temp 37.2  C (99  F) 11/11/22 1940   Pulse 96 11/11/22 1941   Resp 9 11/11/22 1941   SpO2 95 % 11/11/22 1941   Vitals shown include unvalidated device data.    Electronically Signed By: Malcolm Albert MD  November 11, 2022  11:32 PM

## 2022-11-12 NOTE — ANESTHESIA PROCEDURE NOTES
Airway       Patient location during procedure: OR       Procedure Start/Stop Times: 11/11/2022 6:13 PM  Staff -        CRNA: Negra Cobian APRN CRNA       Performed By: CRNA  Consent for Airway        Urgency: elective  Indications and Patient Condition       Indications for airway management: jose-procedural         Mask difficulty assessment: 1 - vent by mask    Final Airway Details       Final airway type: endotracheal airway       Successful airway: ETT - single  Endotracheal Airway Details        ETT size (mm): 7.0       Cuffed: yes       Cuff volume (mL): 10       Successful intubation technique: video laryngoscopy       VL Blade Size: Barrientos 3       Grade View of Cords: 1       Adjucts: stylet       Position: Right       Measured from: gums/teeth       Secured at (cm): 21       Bite block used: None    Post intubation assessment        Placement verified by: capnometry, equal breath sounds and chest rise        Number of attempts at approach: 1       Secured with: pink tape       Ease of procedure: easy       Dentition: Intact and Unchanged    Medication(s) Administered   Medication Administration Time: 11/11/2022 6:13 PM

## 2022-11-12 NOTE — ANESTHESIA CARE TRANSFER NOTE
Patient: Gonzalez Bianchi    Procedure: Procedure(s):  INCISION AND DRAINAGE PERIRECTAL ABSCESS       Diagnosis: Perirectal abscess [K61.1]  Diagnosis Additional Information: No value filed.    Anesthesia Type:   General     Note:    Oropharynx: oropharynx clear of all foreign objects  Level of Consciousness: awake  Oxygen Supplementation: face mask    Independent Airway: airway patency satisfactory and stable  Dentition: dentition unchanged  Vital Signs Stable: post-procedure vital signs reviewed and stable  Report to RN Given: handoff report given  Patient transferred to: PACU    Handoff Report: Identifed the Patient, Identified the Reponsible Provider, Reviewed the pertinent medical history, Discussed the surgical course, Reviewed Intra-OP anesthesia mangement and issues during anesthesia, Set expectations for post-procedure period and Allowed opportunity for questions and acknowledgement of understanding      Vitals:  Vitals Value Taken Time   /86 11/11/22 1940   Temp 37.2  C (99  F) 11/11/22 1910   Pulse 96 11/11/22 1941   Resp 9 11/11/22 1941   SpO2 95 % 11/11/22 1941   Vitals shown include unvalidated device data.    Electronically Signed By: ARLENE aGrcia CRNA  November 11, 2022  7:46 PM

## 2022-11-12 NOTE — OR NURSING
VSS. A&O. Denies pain. Dressing intact, some serosanguinous drainage present. Virgilio PO, no N/V. DC instructions, meds and follow up reviewed with patient and sig other. DC to home with sig other.

## 2022-11-12 NOTE — OP NOTE
OPERATIVE REPORT      PREOPERATIVE DIAGNOSIS: Right ischio rectal abscess    POSTOPERATIVE DIAGNOSIS: Right ischial rectal abscess with intersphincteric component    OPERATION PERFORMED:   1. Exam under anesthesia  2. Incision and drainage of ischiorectal abscess with intersphincteric extension     SURGEON: Dior Madera MD    ANESTHESIA: General.     INDICATIONS: Gonzalez Bianchi is a 57 year old man who initially developed perianal pain 3 days ago. This pain has continued to worsen and is associated with swelling and tenderness in the right lateral position. Evaluation revealed a leukocytosis and CT showing a ischiorectal abscess in the right lateral position extending posteriorly. The risks and benefits of of an exam under anesthesia with abscess drainage were discussed and the patient agreed to proceed.     OPERATIVE FINDINGS: Swelling and cellulitis were seen along the right perianal area.  On anoscopic exam there was purulent drainage from an internal opening in the posterior midline just inside the anal verge.  This opening was extended toward the anal verge, revealing an underlying cavity that extended into the right anterior lateral position.  A counterincision was made in the right anterior lateral position within the skin. The distal rectal anal canal mucosa otherwise appeared normal.    PROCEDURE IN DETAIL: After informed consent was obtained the patient was brought to the operating room. SCD's were placed and bilateral lower extremeties, and general anesthesia was induced without difficulty. He was flipped into the well-padded prone jackknife position with the buttocks taped apart. The perianal region was sterilely prepped and draped in usual fashion. An anal field block consisting of 30 mL of 0.5% Marcaine was instilled into the 4 quadrants of the anal canal.     External exam revealed cellulitis and fullness in the right lateral position.  Digital rectal exam was then performed with fullness in the  right lateral and right posterior lateral positions.  Anoscopy was then completed with a medium Hill-Kneney retractor.  The distal rectal and anal canal mucosa appeared normal, other than some inflammation of the posterior midline with purulent drainage from a pinpoint opening just inside the anal verge.  A right angle clamp was placed inside this internal opening, and the opening was extended distally towards the anal verge.  With extension of this internal opening, there was drainage of approximately 30 mL of purulent drainage.  With digitation of this internal opening, there was a large cavity extending into the right lateral and right anterior lateral positions.  A counterincision was made in the right anterior lateral position to allow adequate drainage of the large cavity. This abscess cavity was approximately 5 cm by 5 cm x 3cm in size. The cavity was irrigated with sterile saline solution and hemostasis ensured. The cavity was packed with a corner of Kerlix fluff and a dry dressing applied.     The patient tolerated the procedure well without complications. At the end of the procedure, all needle, sponge and instrument counts were correct. The patient was returned to the supine position, extubated and brought to the recovery room in stable condition.    EBL: 10 ml  SPECIMEN: none  COMPLICATIONS: none    Dior Madera MD

## 2022-11-12 NOTE — DISCHARGE INSTRUCTIONS
Today you received Toradol, an antiinflammatory medication similar to Ibuprofen.  You should not take other antiinflammatory medication, such as Ibuprofen, Motrin, Advil, Aleve, Naprosyn, etc until 7:10pm.      Same Day Surgery Discharge Instructions for  Sedation and General Anesthesia     It's not unusual to feel dizzy, light-headed or faint for up to 24 hours after surgery or while taking pain medication.  If you have these symptoms: sit for a few minutes before standing and have someone assist you when you get up to walk or use the bathroom.    You should rest and relax for the next 24 hours. We recommend you make arrangements to have an adult stay with you for at least 24 hours after your discharge.  Avoid hazardous and strenuous activity.    DO NOT DRIVE any vehicle or operate mechanical equipment for 24 hours following the end of your surgery.  Even though you may feel normal, your reactions may be affected by the medication you have received.    Do not drink alcoholic beverages for 24 hours following surgery.     Slowly progress to your regular diet as you feel able. It's not unusual to feel nauseated and/or vomit after receiving anesthesia.  If you develop these symptoms, drink clear liquids (apple juice, ginger ale, broth, 7-up, etc. ) until you feel better.  If your nausea and vomiting persists for 24 hours, please notify your surgeon.      All narcotic pain medications, along with inactivity and anesthesia, can cause constipation. Drinking plenty of liquids and increasing fiber intake will help.    For any questions of a medical nature, call your surgeon.    Do not make important decisions for 24 hours.    If you had general anesthesia, you may have a sore throat for a couple of days related to the breathing tube used during surgery.  You may use Cepacol lozenges to help with this discomfort.  If it worsens or if you develop a fever, contact your surgeon.     If you feel your pain is not well managed  with the pain medications prescribed by your surgeon, please contact your surgeon's office to let them know so they can address your concerns.      *If you have questions or concerns about your procedure,  call Dr. Madera at [phone]**

## 2023-01-03 ENCOUNTER — HOSPITAL ENCOUNTER (EMERGENCY)
Facility: CLINIC | Age: 58
Discharge: HOME | End: 2023-01-04
Attending: EMERGENCY MEDICINE | Admitting: EMERGENCY MEDICINE
Payer: COMMERCIAL

## 2023-01-03 ENCOUNTER — APPOINTMENT (OUTPATIENT)
Dept: CT IMAGING | Facility: CLINIC | Age: 58
End: 2023-01-03
Attending: EMERGENCY MEDICINE
Payer: COMMERCIAL

## 2023-01-03 VITALS
DIASTOLIC BLOOD PRESSURE: 100 MMHG | TEMPERATURE: 99.1 F | BODY MASS INDEX: 29.65 KG/M2 | WEIGHT: 195 LBS | SYSTOLIC BLOOD PRESSURE: 157 MMHG | HEART RATE: 100 BPM | OXYGEN SATURATION: 99 % | RESPIRATION RATE: 18 BRPM

## 2023-01-03 DIAGNOSIS — U07.1 INFECTION DUE TO 2019 NOVEL CORONAVIRUS: ICD-10-CM

## 2023-01-03 LAB
ALBUMIN SERPL-MCNC: 3.9 G/DL (ref 3.4–5)
ALBUMIN UR-MCNC: 10 MG/DL
ALP SERPL-CCNC: 62 U/L (ref 40–150)
ALT SERPL W P-5'-P-CCNC: 30 U/L (ref 0–70)
ANION GAP SERPL CALCULATED.3IONS-SCNC: 8 MMOL/L (ref 3–14)
APPEARANCE UR: CLEAR
AST SERPL W P-5'-P-CCNC: 18 U/L (ref 0–45)
BASOPHILS # BLD AUTO: 0.1 10E3/UL (ref 0–0.2)
BASOPHILS NFR BLD AUTO: 1 %
BILIRUB SERPL-MCNC: 0.3 MG/DL (ref 0.2–1.3)
BILIRUB UR QL STRIP: NEGATIVE
BUN SERPL-MCNC: 11 MG/DL (ref 7–30)
CALCIUM SERPL-MCNC: 8.8 MG/DL (ref 8.5–10.1)
CHLORIDE BLD-SCNC: 96 MMOL/L (ref 94–109)
CO2 SERPL-SCNC: 26 MMOL/L (ref 20–32)
COLOR UR AUTO: YELLOW
CREAT SERPL-MCNC: 0.9 MG/DL (ref 0.66–1.25)
EOSINOPHIL # BLD AUTO: 0 10E3/UL (ref 0–0.7)
EOSINOPHIL NFR BLD AUTO: 0 %
ERYTHROCYTE [DISTWIDTH] IN BLOOD BY AUTOMATED COUNT: 12.7 % (ref 10–15)
FLUAV RNA SPEC QL NAA+PROBE: NEGATIVE
FLUBV RNA RESP QL NAA+PROBE: NEGATIVE
GFR SERPL CREATININE-BSD FRML MDRD: >90 ML/MIN/1.73M2
GLUCOSE BLD-MCNC: 109 MG/DL (ref 70–99)
GLUCOSE UR STRIP-MCNC: NEGATIVE MG/DL
HCT VFR BLD AUTO: 45.3 % (ref 40–53)
HGB BLD-MCNC: 15 G/DL (ref 13.3–17.7)
HGB UR QL STRIP: NEGATIVE
IMM GRANULOCYTES # BLD: 0 10E3/UL
IMM GRANULOCYTES NFR BLD: 0 %
KETONES UR STRIP-MCNC: 20 MG/DL
LACTATE SERPL-SCNC: 0.9 MMOL/L (ref 0.7–2)
LEUKOCYTE ESTERASE UR QL STRIP: NEGATIVE
LIPASE SERPL-CCNC: 111 U/L (ref 73–393)
LYMPHOCYTES # BLD AUTO: 0.4 10E3/UL (ref 0.8–5.3)
LYMPHOCYTES NFR BLD AUTO: 6 %
MCH RBC QN AUTO: 30.8 PG (ref 26.5–33)
MCHC RBC AUTO-ENTMCNC: 33.1 G/DL (ref 31.5–36.5)
MCV RBC AUTO: 93 FL (ref 78–100)
MONOCYTES # BLD AUTO: 1.1 10E3/UL (ref 0–1.3)
MONOCYTES NFR BLD AUTO: 15 %
NEUTROPHILS # BLD AUTO: 5.8 10E3/UL (ref 1.6–8.3)
NEUTROPHILS NFR BLD AUTO: 78 %
NITRATE UR QL: NEGATIVE
NRBC # BLD AUTO: 0 10E3/UL
NRBC BLD AUTO-RTO: 0 /100
PH UR STRIP: 6.5 [PH] (ref 5–7)
PLATELET # BLD AUTO: 245 10E3/UL (ref 150–450)
POTASSIUM BLD-SCNC: 3.8 MMOL/L (ref 3.4–5.3)
PROT SERPL-MCNC: 7.7 G/DL (ref 6.8–8.8)
RBC # BLD AUTO: 4.87 10E6/UL (ref 4.4–5.9)
RBC URINE: 2 /HPF
RSV RNA SPEC NAA+PROBE: NEGATIVE
SARS-COV-2 RNA RESP QL NAA+PROBE: POSITIVE
SODIUM SERPL-SCNC: 130 MMOL/L (ref 133–144)
SP GR UR STRIP: 1.02 (ref 1–1.03)
UROBILINOGEN UR STRIP-MCNC: NORMAL MG/DL
WBC # BLD AUTO: 7.4 10E3/UL (ref 4–11)
WBC URINE: 1 /HPF

## 2023-01-03 PROCEDURE — 85025 COMPLETE CBC W/AUTO DIFF WBC: CPT | Performed by: EMERGENCY MEDICINE

## 2023-01-03 PROCEDURE — 74177 CT ABD & PELVIS W/CONTRAST: CPT

## 2023-01-03 PROCEDURE — C9803 HOPD COVID-19 SPEC COLLECT: HCPCS

## 2023-01-03 PROCEDURE — 250N000011 HC RX IP 250 OP 636: Performed by: EMERGENCY MEDICINE

## 2023-01-03 PROCEDURE — 83605 ASSAY OF LACTIC ACID: CPT | Performed by: EMERGENCY MEDICINE

## 2023-01-03 PROCEDURE — 96361 HYDRATE IV INFUSION ADD-ON: CPT

## 2023-01-03 PROCEDURE — 83690 ASSAY OF LIPASE: CPT | Performed by: EMERGENCY MEDICINE

## 2023-01-03 PROCEDURE — 36415 COLL VENOUS BLD VENIPUNCTURE: CPT | Performed by: EMERGENCY MEDICINE

## 2023-01-03 PROCEDURE — 87637 SARSCOV2&INF A&B&RSV AMP PRB: CPT | Performed by: EMERGENCY MEDICINE

## 2023-01-03 PROCEDURE — 81001 URINALYSIS AUTO W/SCOPE: CPT | Performed by: EMERGENCY MEDICINE

## 2023-01-03 PROCEDURE — 250N000009 HC RX 250: Performed by: EMERGENCY MEDICINE

## 2023-01-03 PROCEDURE — 96374 THER/PROPH/DIAG INJ IV PUSH: CPT | Mod: 59

## 2023-01-03 PROCEDURE — 99285 EMERGENCY DEPT VISIT HI MDM: CPT | Mod: CS,25

## 2023-01-03 PROCEDURE — 80053 COMPREHEN METABOLIC PANEL: CPT | Performed by: EMERGENCY MEDICINE

## 2023-01-03 PROCEDURE — 258N000003 HC RX IP 258 OP 636: Performed by: EMERGENCY MEDICINE

## 2023-01-03 RX ORDER — ONDANSETRON 2 MG/ML
4 INJECTION INTRAMUSCULAR; INTRAVENOUS ONCE
Status: COMPLETED | OUTPATIENT
Start: 2023-01-03 | End: 2023-01-03

## 2023-01-03 RX ORDER — IOPAMIDOL 755 MG/ML
98 INJECTION, SOLUTION INTRAVASCULAR ONCE
Status: COMPLETED | OUTPATIENT
Start: 2023-01-03 | End: 2023-01-03

## 2023-01-03 RX ADMIN — SODIUM CHLORIDE 1000 ML: 9 INJECTION, SOLUTION INTRAVENOUS at 16:45

## 2023-01-03 RX ADMIN — ONDANSETRON 4 MG: 2 INJECTION INTRAMUSCULAR; INTRAVENOUS at 16:45

## 2023-01-03 RX ADMIN — SODIUM CHLORIDE 68 ML: 9 INJECTION, SOLUTION INTRAVENOUS at 21:10

## 2023-01-03 RX ADMIN — IOPAMIDOL 98 ML: 755 INJECTION, SOLUTION INTRAVENOUS at 21:10

## 2023-01-03 NOTE — Clinical Note
Gonzalez Bianchi was seen and treated in our emergency department on 1/3/2023.  He may return to work on 01/11/2023.       If you have any questions or concerns, please don't hesitate to call.      Chelsi Mckay MD

## 2023-01-03 NOTE — ED TRIAGE NOTES
Patient has multiple complaints including low back pain, leg pain, groin pain, abdominal pain, nausea, vomiting, headache and feeling weak that all started this morning.      Triage Assessment     Row Name 01/03/23 3385       Triage Assessment (Adult)    Airway WDL WDL       Respiratory WDL    Respiratory WDL WDL       Skin Circulation/Temperature WDL    Skin Circulation/Temperature WDL WDL       Cardiac WDL    Cardiac WDL WDL       Peripheral/Neurovascular WDL    Peripheral Neurovascular WDL WDL       Cognitive/Neuro/Behavioral WDL    Cognitive/Neuro/Behavioral WDL WDL

## 2023-01-03 NOTE — ED NOTES
Rapid Assessment Note    History:   Gonzalez Bianchi is a 57 year old male with history of rectal abscess who presents with back pain that radiates down the lateral side of his left leg. Patient provides that this pain started last night and has been severe for him throughout today. Additionally, complains of increased fatigue, headache, dizziness, and pain in his groin. Since his pain has started, the patient has been unable to sit still due to it. Also notes he had a temperature of 100.5 at home but otherwise denies any recent cough, sore throat, rhinorrhea, hematuria, dysuria, or history of kidney stones.    Exam:   Constitutional: Well appearing.  HEENT: Atraumatic. Moist mucous membranes.  Neck: Soft.  Supple.    Cardiac: Regular rate and rhythm.  No murmur or rub.  Respiratory: Clear to auscultation bilaterally.  No respiratory distress.  No wheezing, rhonchi, or rales.  Abdomen: Soft and nontender.  No rebound or guarding.  Nondistended.  Musculoskeletal: No edema.  Normal range of motion.  Neurologic: Alert and oriented.  Normal tone and bulk. Normal gait.  Skin: No rashes.  No edema.  Psych: Normal affect.  Normal behavior.      Plan of Care:   I evaluated the patient and developed an initial plan of care. I discussed this plan and explained that I, or one of my partners, would be returning to complete the evaluation.     Will obtain blood work and CT scan of the abdomen and pelvis to rule out any deep infection from his previous rectal abscess.  Currently has a benign abdominal exam and denies any pain or swelling of the rectum.  Also has some flank pain and has sitting still so eval for kidney stone.  He has fever and seems like body aches as well so we will test COVID and influenza.    I, Mendoza Hummel, am serving as a scribe to document services personally performed by Didier Torrez MD, based on my observations and the provider's statements to me.    1/3/2023  [employer]    Portions of this medical record were completed by a scribe. UPON MY REVIEW AND AUTHENTICATION BY ELECTRONIC SIGNATURE, this confirms (a) I performed the applicable clinical services, and (b) the record is accurate.      Didier Torrez MD  01/03/23 9184

## 2023-01-04 RX ORDER — IBUPROFEN 600 MG/1
600 TABLET, FILM COATED ORAL EVERY 6 HOURS PRN
Qty: 30 TABLET | Refills: 0 | Status: SHIPPED | OUTPATIENT
Start: 2023-01-04

## 2023-01-04 RX ORDER — ONDANSETRON 4 MG/1
4 TABLET, ORALLY DISINTEGRATING ORAL EVERY 8 HOURS PRN
Qty: 10 TABLET | Refills: 0 | Status: SHIPPED | OUTPATIENT
Start: 2023-01-04 | End: 2023-01-07

## 2023-01-04 ASSESSMENT — ENCOUNTER SYMPTOMS
HEADACHES: 1
FATIGUE: 1
ABDOMINAL PAIN: 1
FEVER: 1
DIZZINESS: 1
BACK PAIN: 1

## 2023-01-04 NOTE — ED PROVIDER NOTES
History     Chief Complaint:  Back Pain, Abdominal Pain, and Fever       The history is provided by the patient.      Gonzalez Bianchi is a 57 year old male who presents with back pain, fatigue, headache. The patient states that last night he began to experience back pain that seemed to radiate down his left leg. He then woke up today and while going to work he began to notice some fatigue, headache, dizziness, abdominal pain and a fever at home of 100.5.     ROS:  Review of Systems   Constitutional: Positive for fatigue and fever (100.5).   Gastrointestinal: Positive for abdominal pain.   Musculoskeletal: Positive for back pain.   Neurological: Positive for dizziness and headaches.   All other systems reviewed and are negative.    Allergies:  The patient denies any allergies      Medications:    The patient denies any routine medications     Past Medical History:    Benign essential hypertension   Alcohol use   Tobacco use   Umbilical hernia   Osteolysis clavicle     Past Surgical History:    Hernia repair   I & D rectum      Family History:    Diabetes     Social History:   reports that he has been smoking. He has been smoking an average of .5 packs per day. He does not have any smokeless tobacco history on file. He reports current alcohol use. He reports that he does not use drugs.  PCP: [facility]     Physical Exam     Patient Vitals for the past 24 hrs:   BP Temp Temp src Pulse Resp SpO2 Weight   01/03/23 1645 (!) 157/100 99.1  F (37.3  C) Temporal 100 18 99 % 88.5 kg (195 lb)        Physical Exam  General: Alert, No distress. Nontoxic appearance  Head: No signs of trauma.   Mouth/Throat: Oropharynx moist.   Eyes: Conjunctivae are normal. Pupils are equal..   Neck: Normal range of motion.    CV: Appears well perfused.  Resp: Lungs are clear, no respiratory distress.   MSK: Normal range of motion. No obvious deformity.   Neuro: The patient is alert and interactive. HUANG. Speech normal.  GCS 15  Skin: No lesion or sign of trauma noted.   Psych: normal mood and affect. behavior is normal.       Emergency Department Course     Imaging:  CT Abdomen Pelvis w Contrast   Final Result   IMPRESSION:    1.  No acute abnormality is seen. No abscess is identified. No fluid collection at the perirectal or perianal positions currently seen.   2.  Indeterminate hypodense nodule at the right liver. Suggest further characterization with nonurgent hepatic MRI.         Report per radiology    Laboratory:  Labs Ordered and Resulted from Time of ED Arrival to Time of ED Departure   COMPREHENSIVE METABOLIC PANEL - Abnormal       Result Value    Sodium 130 (*)     Potassium 3.8      Chloride 96      Carbon Dioxide (CO2) 26      Anion Gap 8      Urea Nitrogen 11      Creatinine 0.90      Calcium 8.8      Glucose 109 (*)     Alkaline Phosphatase 62      AST 18      ALT 30      Protein Total 7.7      Albumin 3.9      Bilirubin Total 0.3      GFR Estimate >90     ROUTINE UA WITH MICROSCOPIC REFLEX TO CULTURE - Abnormal    Color Urine Yellow      Appearance Urine Clear      Glucose Urine Negative      Bilirubin Urine Negative      Ketones Urine 20 (*)     Specific Gravity Urine 1.021      Blood Urine Negative      pH Urine 6.5      Protein Albumin Urine 10 (*)     Urobilinogen Urine Normal      Nitrite Urine Negative      Leukocyte Esterase Urine Negative      RBC Urine 2      WBC Urine 1     INFLUENZA A/B & SARS-COV2 PCR MULTIPLEX - Abnormal    Influenza A PCR Negative      Influenza B PCR Negative      RSV PCR Negative      SARS CoV2 PCR Positive (*)    CBC WITH PLATELETS AND DIFFERENTIAL - Abnormal    WBC Count 7.4      RBC Count 4.87      Hemoglobin 15.0      Hematocrit 45.3      MCV 93      MCH 30.8      MCHC 33.1      RDW 12.7      Platelet Count 245      % Neutrophils 78      % Lymphocytes 6      % Monocytes 15      % Eosinophils 0      % Basophils 1      % Immature Granulocytes 0      NRBCs per 100 WBC 0      Absolute  Neutrophils 5.8      Absolute Lymphocytes 0.4 (*)     Absolute Monocytes 1.1      Absolute Eosinophils 0.0      Absolute Basophils 0.1      Absolute Immature Granulocytes 0.0      Absolute NRBCs 0.0     LIPASE - Normal    Lipase 111     LACTIC ACID WHOLE BLOOD - Normal    Lactic Acid 0.9          Emergency Department Course & Assessments:         Interventions:  Medications   0.9% sodium chloride BOLUS (0 mLs Intravenous Stopped 1/4/23 0020)   ondansetron (ZOFRAN) injection 4 mg (4 mg Intravenous Given 1/3/23 1645)   iopamidol (ISOVUE-370) solution 98 mL (98 mLs Intravenous Given 1/3/23 2110)   Saline Flush (68 mLs Intravenous Given 1/3/23 2110)        Consultations/Discussion of Management or Tests:  0017 I consulted with the patient and obtained history         Disposition:  The patient was discharged to home.     Impression & Plan    CMS Diagnoses: None    Medical Decision Making:  Gonzalez Bianchi is a 57 year old male who presents to the emergency department complaining of fatigue, back pain, headaches.  They are currently not in respiratory distress.  Vital signs are normal.  They have felt ill for the past 2 days.  They have no known ill contacts.  They have not been vaccinated.  Underlying medical conditions: none.  Heis stable for discharge.  Pulse oximeter provided.  Strict return precautions given including hypoxia, increased work of breathing, palpitations, chest pain, pleuritic pain, or any new symptoms.  Discharged with recommendations for outpatient follow-up.    Diagnosis:    ICD-10-CM    1. Infection due to 2019 novel coronavirus  U07.1            Discharge Medications:  Discharge Medication List as of 1/4/2023 12:27 AM      START taking these medications    Details   ibuprofen (ADVIL/MOTRIN) 600 MG tablet Take 1 tablet (600 mg) by mouth every 6 hours as needed for moderate pain (4-6), Disp-30 tablet, R-0, E-Prescribe      ondansetron (ZOFRAN ODT) 4 MG ODT tab Take 1 tablet (4 mg) by mouth every 8  hours as needed for nausea, Disp-10 tablet, R-0, E-Prescribe              Scribe Disclosure:  I, Skip Evans, am serving as a scribe at 12:16 AM on 1/4/2023 to document services personally performed by Chelsi Mckay MD based on my observations and the provider's statements to me.     1/4/2023   Chelsi Mckay MD Debroux, Karah M, MD  01/04/23 0722

## 2023-01-04 NOTE — DISCHARGE INSTRUCTIONS

## 2023-11-07 ENCOUNTER — HOSPITAL ENCOUNTER (EMERGENCY)
Facility: CLINIC | Age: 58
Discharge: HOME | End: 2023-11-07
Attending: EMERGENCY MEDICINE | Admitting: EMERGENCY MEDICINE
Payer: COMMERCIAL

## 2023-11-07 VITALS
WEIGHT: 195 LBS | RESPIRATION RATE: 18 BRPM | SYSTOLIC BLOOD PRESSURE: 165 MMHG | BODY MASS INDEX: 29.55 KG/M2 | OXYGEN SATURATION: 99 % | HEART RATE: 88 BPM | HEIGHT: 68 IN | DIASTOLIC BLOOD PRESSURE: 121 MMHG | TEMPERATURE: 98.2 F

## 2023-11-07 DIAGNOSIS — M79.602 PAIN OF LEFT UPPER EXTREMITY: ICD-10-CM

## 2023-11-07 DIAGNOSIS — M54.12 CERVICAL RADICULOPATHY: ICD-10-CM

## 2023-11-07 PROCEDURE — 99284 EMERGENCY DEPT VISIT MOD MDM: CPT

## 2023-11-07 RX ORDER — GABAPENTIN 300 MG/1
CAPSULE ORAL
Qty: 87 CAPSULE | Refills: 0 | Status: SHIPPED | OUTPATIENT
Start: 2023-11-07 | End: 2023-12-07

## 2023-11-07 RX ORDER — METHOCARBAMOL 500 MG/1
500-1000 TABLET, FILM COATED ORAL 4 TIMES DAILY PRN
Qty: 20 TABLET | Refills: 0 | Status: SHIPPED | OUTPATIENT
Start: 2023-11-07

## 2023-11-07 RX ORDER — PREDNISONE 20 MG/1
TABLET ORAL
Qty: 10 TABLET | Refills: 0 | Status: SHIPPED | OUTPATIENT
Start: 2023-11-07

## 2023-11-07 NOTE — ED PROVIDER NOTES
"  History     Chief Complaint:  Shoulder Pain     HPI   Gonzalez Bianchi is a 58 year old male who presents to the ED with complaints of left shoulder and arm pain. He has a history of cervical issues with MRI 6 years ago.   This pain started as feeling like a \"nail being driven in\" in his left scapular region 1 week ago. Pain has been worsening and spreading along his upper back, neck, and down his arm. He endorses pain radiating to his last 2 fingers and occasional tingling down the arm. He denies recent injury, trauma, or falls. No right arm symptoms. He has been able to work out the past week without difficulty but pain is particularly bothersome when he is at rest. He has been having difficulty sleeping. He takes 2 ibuprofen and then is able to sleep for 2 hours before waking up from pain again. 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 He has never seen orthopedics or undergone physical therapy.  He denies weakness or numbness.  He denies history of IV drug use, cancer, or anticoagulation.    Independent Historian:   None - Patient Only    Review of External Notes:   Reviewed MRI from 2018.   Advanced degenerative changes seen within the vertebral bodies and endplates at C4-C7 with mild to moderate anterior wedging. Moderate degree of nonspecific STIR hyperintensity/edema within the vertebral bodies and endplates at C5-C6 with mild STIR hyperintensity/edema within the intervening disc. Nonspecific STIR hyperintensity/edema within the facet joint on the left at C2-C3. Patient is kyphoscoliotic.     Medications:    gabapentin (NEURONTIN) 300 MG capsule  methocarbamol (ROBAXIN) 500 MG tablet  predniSONE (DELTASONE) 20 MG tablet  ibuprofen (ADVIL/MOTRIN) 600 MG tablet  oxyCODONE (ROXICODONE) 5 MG tablet      Past Medical History:    No past medical history on file.    Past Surgical History:    Past Surgical History:   Procedure Laterality Date    HERNIA " "REPAIR      IRRIGATION AND DEBRIDEMENT RECTUM, COMBINED N/A 11/11/2022    Procedure: INCISION AND DRAINAGE PERIRECTAL ABSCESS;  Surgeon: Dior Maedra MD;  Location:  OR        Physical Exam   Patient Vitals for the past 24 hrs:   BP Temp Temp src Pulse Resp SpO2 Height Weight   11/07/23 0831 (!) 165/121 -- -- 88 -- 99 % -- --   11/07/23 0719 (!) 178/114 98.2  F (36.8  C) Temporal 98 18 98 % 1.727 m (5' 8\") 88.5 kg (195 lb)        Physical Exam  Vital signs and nursing notes reviewed.    General:  Alert and oriented, no acute distress. Moving spontaneously throughout the room. Sitting, then standing. Frequently reaching and using both arms.  Skin: Skin is warm and dry. No rashes, lesions, or erythema. No diaphoresis.  Head: Normocephalic, atraumatic. Facial features symmetric.   Neck: Normal range of motion. Neck supple with no lymphadenopathy. No tracheal deviation.   CV:  Heart RRR with no murmurs or extra heart sounds. 2+ radial and tibialis posterior pulses bilaterally. No peripheral edema.  Pulm/Chest: Chest wall expansion symmetric with no increased effort of breathing. No respiratory distress. Coarse wheezes with inspiration bilaterally. .   M/S: Moves all extremities spontaneously.  The cervical and thoracic spine is non-tender in the midline  The lumbar spine is non-tender in the midline  Strength is 5/5 bilaterally in the upper extremities including  strength, biceps, triceps, deltoids.  No pain with empty can test.  Sensation to light touch is intact in all 5 fingers.  He has some subjective numbness to the fourth and fifth fingers.  Capillary refill is normal.  Tenderness to palpation of the musculature in the left trapezius left lateral neck, and medial to the left scapula.  Psych: Normal mood and affect. Behavior is normal.     Emergency Department Course     Imaging:  No orders to display      Laboratory:  Labs Ordered and Resulted from Time of ED Arrival to Time of ED Departure - No " data to display     Procedures   None    Emergency Department Course & Assessments:         Interventions:  Medications - No data to display     Independent Interpretation (X-rays, CTs, rhythm strip):  None    Consultations/Discussion of Management or Tests:  None     Assessments:  ED Course as of 11/07/23 1129   Tue Nov 07, 2023   0756 I initially assessed the patient and obtained the above history and physical exam.     0801 Dr. Ying assessed the patient.   0815 We discussed plan of care.        Social Determinants of Health affecting care:   None    Disposition:  The patient was discharged to home.     Impression & Plan    CMS Diagnoses: None    Medical Decision Making:  Gonzalez Bianchi is a 58 year old male who presents for evaluation of left arm pain and finger numbness for the past week. Upon chart review, he has multi level cervical disease according to 2018 c-spine MRI. See HPI. Hypertensive but remainder of vital signs are normal. On exam, his strength and sensation is intact and he has subjective tingling in 4th and 5th gingers on the LUE. He has no midline tenderness, fevers, trauma, history of IV drug use, cancer, or anticoagulation, or any other red flag findings to warrant emergent MRI. Very low suspicion for discitis, osteomyelitis, epidural abscess, epidural hematoma. Exam findings are reassuring and do not indicate need for emergent neuro surg consult. He is moving freely without pain and able to function per his baseline. This appears to be a flare-up of a pre-existing condition and we discussed the details of treatment and next steps.  Clinical context is consistent with a musculoskeletal problem and do not suspect a cardio or pulmonary etiology of his symptoms.  Will plan for short burst of steroids as well as gabapentin for pain.  He can continue Tylenol ibuprofen at home and we will also prescribe a muscle relaxer as needed for sleep.  He was instructed not to drive, drink alcohol, or operate  machinery on this medication.  We provided phone number for follow-up with [facility] as the patient will likely need ongoing management of this condition including possible physical therapy and surgical consult.  He is agreeable to this plan and had questions answered.  He is also instructed to return to the ED should he develop numbness, more severe pain, fevers, weakness, or any further concerns.    I staffed this patient with Dr. Ying who agrees with the above assessment and plan.    Diagnosis:    ICD-10-CM    1. Cervical radiculopathy  M54.12       2. Pain of left upper extremity  M79.602            Discharge Medications:  Discharge Medication List as of 11/7/2023  8:28 AM        START taking these medications    Details   gabapentin (NEURONTIN) 300 MG capsule Take 1 capsule (300 mg) by mouth daily for 1 day, THEN 1 capsule (300 mg) 2 times daily for 1 day, THEN 1 capsule (300 mg) 3 times daily for 28 days., Disp-87 capsule, R-0, E-Prescribe      methocarbamol (ROBAXIN) 500 MG tablet Take 1-2 tablets (500-1,000 mg) by mouth 4 times daily as needed for muscle spasms, Disp-20 tablet, R-0, E-Prescribe      predniSONE (DELTASONE) 20 MG tablet Take two tablets (= 40mg) each day for 5 (five) days, Disp-10 tablet, R-0, E-Prescribe              Janine Anderson PA-C on 11/7/2023 at 11:39 AM         Janine Anderson PA-C  11/07/23 1139

## 2023-11-07 NOTE — ED TRIAGE NOTES
Left shoulder pain that is now radiating down the left hand with numb feeling in 2 fingers. Pain started last Tuesday. Taking ibuprofen

## 2023-11-07 NOTE — ED PROVIDER NOTES
Emergency Department Attending Supervision Note  11/7/2023  8:17 AM      I evaluated this patient in conjunction with DENNIS Vega      Briefly, the patient presented with left numbness and tingling to the fourth and fifth fingers, left scapular pain and left lateral neck pain.  Patient notes she has had problems in the past resulting in right-sided symptoms and had an MRI and was going to physical therapy but then had a slip and fall down a flight of stairs and his symptoms resolved after that.  He does do a fair amount of lifting for his work as well as exercise and lifting in the gym.  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  He notes pain that struggles with at night especially trying to sleep but he does feel is active he seems to tolerate it better.  Denies any weakness mainly the numbness down the left arm into the fourth and fifth fingers.      On my exam, 5 of 5  strength, biceps, triceps, deltoid are all intact without weakness.  Some subjective numbness to the fourth and fifth fingers.  Mild pain along the left lateral neck as well as into the trapezius area.    Results:    ED course:    Patient presents with a cervical radiculopathy on the left side.  I reviewed an MRI from the past in 2018 showing multilevel disease.  Patient would like an MRI but discussed with him he does not have any red flags to warrant immediate ER imaging.  Certainly looks to have a flareup of a pre-existing condition and discussed treatment with him.  Do not suspect discitis or epidural hematoma and there is no focal weakness to warrant emergent surgical consultation.  Discussed treatment with him and follow-up.  Here is a copy of his MRI from 2018 [facility].  This appears very musculoskeletal in nature and do not suspect cardiac or pulmonary etiology for his symptoms.    MRI 2018 [facility]    IMPRESSION:   1. Moderate degree of nonspecific STIR hyperintensity/edema within the  vertebral bodies and endplates at C5-C6 eccentric to the right with mild STIR hyperintensity/edema within the intervening disc. In the absence of any clinical concern for discitis/osteomyelitis it is favored that this is inflammatory or degenerative in etiology. Clinically correlate. Recommend short-term follow-up MRI.   2. Nonspecific STIR hyperintensity/edema within the facet joint on the left at C2-C3 with underlying advanced facet arthropathy. Favor inflammatory or degenerative etiology in the absence of any clinical concern for infection.   3. Severe neural foramina narrowing on the left at C2-C3.   4. Severe neural foramina narrowing on the left and moderate to severe on the right at C3-C4.   5.Severe neural foramina narrowing on the right at C4-C5 and moderate to severe neural foramina narrowing on the left.   6. Severe neural foramina narrowing on the left and moderate to severe neural foramina narrowing on the right at C5-C6.   7. Severe neural foramina narrowing on the right at C6-C7.   8. Moderate to severe neural foramina narrowing on the right at C7-T1.     My impression is         Diagnosis    ICD-10-CM    1. Cervical radiculopathy  M54.12       2. Pain of left upper extremity  M79.602             Boris Ying MD Adams, Shaun L, MD  11/07/23 1016

## 2023-11-07 NOTE — DISCHARGE INSTRUCTIONS
Take Prednisone for 4 days  Take Gabapentin/Neurontin as prescribed (once a day for 1 day, twice a day for 1 day, and 3 times a day after that)  Take ibuprofen (600 mg, 3 tablets) as needed for pain every 6-8 hours  Take muscle relaxer as needed. Do not operate machinery, drive, or drink alcohol while on this medication.  Schedule an appointment with orthopedics for further management of your symptoms.   Return to the ED for numbness, fevers, worsening pain, or any further emergent concerns.

## 2024-01-07 ENCOUNTER — HOSPITAL ENCOUNTER (EMERGENCY)
Facility: CLINIC | Age: 59
Discharge: LEFT BEFORE DISCHARGE | End: 2024-01-07
Payer: COMMERCIAL

## 2024-01-07 VITALS
WEIGHT: 190 LBS | BODY MASS INDEX: 28.79 KG/M2 | OXYGEN SATURATION: 99 % | DIASTOLIC BLOOD PRESSURE: 95 MMHG | HEIGHT: 68 IN | TEMPERATURE: 97.4 F | SYSTOLIC BLOOD PRESSURE: 154 MMHG | HEART RATE: 93 BPM | RESPIRATION RATE: 16 BRPM

## 2024-01-07 ASSESSMENT — ACTIVITIES OF DAILY LIVING (ADL)
ADLS_ACUITY_SCORE: 33

## (undated) DEVICE — LUBRICATING JELLY 4.25OZ

## (undated) DEVICE — POSITIONER PT PRONESAFE HEAD REST W/DERMAPROX INSERT 40599

## (undated) DEVICE — SYR BULB IRRIG 50ML LATEX FREE 0035280

## (undated) DEVICE — ESU GROUND PAD UNIVERSAL W/O CORD

## (undated) DEVICE — SOL WATER IRRIG 1000ML BOTTLE 2F7114

## (undated) DEVICE — DRAPE MINOR PROCEDURE LAP 29496

## (undated) DEVICE — ESU ELEC BLADE NN-STCK PLUMEPEN PRO 360D 10FT PLPRO4020

## (undated) DEVICE — PANTIES MESH LG/XLG 2PK 706M2

## (undated) DEVICE — DECANTER BAG 2002S

## (undated) DEVICE — SUCTION CANISTER MEDIVAC LINER 3000ML W/LID 65651-530

## (undated) DEVICE — PACK MINOR SBA15MIFSE

## (undated) DEVICE — NDL 19GA 1.5"

## (undated) DEVICE — LINEN TOWEL PACK X5 5464

## (undated) DEVICE — SUCTION TIP YANKAUER W/O VENT K86

## (undated) DEVICE — TAPE CLOTH ADHESIVE 3" LATEX FREE

## (undated) RX ORDER — FENTANYL CITRATE 50 UG/ML
INJECTION, SOLUTION INTRAMUSCULAR; INTRAVENOUS
Status: DISPENSED
Start: 2022-11-11

## (undated) RX ORDER — PROPOFOL 10 MG/ML
INJECTION, EMULSION INTRAVENOUS
Status: DISPENSED
Start: 2022-11-11

## (undated) RX ORDER — LIDOCAINE HYDROCHLORIDE 20 MG/ML
INJECTION, SOLUTION EPIDURAL; INFILTRATION; INTRACAUDAL; PERINEURAL
Status: DISPENSED
Start: 2022-11-11

## (undated) RX ORDER — ONDANSETRON 2 MG/ML
INJECTION INTRAMUSCULAR; INTRAVENOUS
Status: DISPENSED
Start: 2022-11-11

## (undated) RX ORDER — DEXAMETHASONE SODIUM PHOSPHATE 4 MG/ML
INJECTION, SOLUTION INTRA-ARTICULAR; INTRALESIONAL; INTRAMUSCULAR; INTRAVENOUS; SOFT TISSUE
Status: DISPENSED
Start: 2022-11-11

## (undated) RX ORDER — KETOROLAC TROMETHAMINE 30 MG/ML
INJECTION, SOLUTION INTRAMUSCULAR; INTRAVENOUS
Status: DISPENSED
Start: 2022-11-11